# Patient Record
Sex: FEMALE | Race: WHITE | Employment: FULL TIME | URBAN - METROPOLITAN AREA
[De-identification: names, ages, dates, MRNs, and addresses within clinical notes are randomized per-mention and may not be internally consistent; named-entity substitution may affect disease eponyms.]

---

## 2017-01-30 ENCOUNTER — OFFICE VISIT (OUTPATIENT)
Dept: FAMILY MEDICINE CLINIC | Facility: CLINIC | Age: 27
End: 2017-01-30

## 2017-01-30 DIAGNOSIS — H65.93 BILATERAL NON-SUPPURATIVE OTITIS MEDIA: Primary | ICD-10-CM

## 2017-01-30 DIAGNOSIS — J02.9 PHARYNGITIS, UNSPECIFIED ETIOLOGY: ICD-10-CM

## 2017-01-30 LAB — CONTROL LINE PRESENT WITH A CLEAR BACKGROUND (YES/NO): YES YES/NO

## 2017-01-30 PROCEDURE — 87880 STREP A ASSAY W/OPTIC: CPT | Performed by: NURSE PRACTITIONER

## 2017-01-30 PROCEDURE — 99213 OFFICE O/P EST LOW 20 MIN: CPT | Performed by: NURSE PRACTITIONER

## 2017-01-30 PROCEDURE — 87081 CULTURE SCREEN ONLY: CPT | Performed by: NURSE PRACTITIONER

## 2017-01-30 RX ORDER — AZITHROMYCIN 250 MG/1
TABLET, FILM COATED ORAL
Qty: 6 TABLET | Refills: 0 | Status: SHIPPED | OUTPATIENT
Start: 2017-01-30 | End: 2017-06-15 | Stop reason: ALTCHOICE

## 2017-02-02 ENCOUNTER — TELEPHONE (OUTPATIENT)
Dept: FAMILY MEDICINE CLINIC | Facility: CLINIC | Age: 27
End: 2017-02-02

## 2017-02-02 VITALS
DIASTOLIC BLOOD PRESSURE: 68 MMHG | HEART RATE: 80 BPM | TEMPERATURE: 98 F | RESPIRATION RATE: 16 BRPM | WEIGHT: 196 LBS | SYSTOLIC BLOOD PRESSURE: 108 MMHG | HEIGHT: 67 IN | BODY MASS INDEX: 30.76 KG/M2

## 2017-02-02 NOTE — TELEPHONE ENCOUNTER
Calling to notify pt of neg strep Cx results. No ID on Voicemail, no informed consent in chart. Left voicemail for pt to return call re: results.   Jay Gonzales, 02/02/2017, 8:54 AM    2/3/2017  Spoke to patient and informed of neg strep throat cultu

## 2017-02-03 NOTE — PROGRESS NOTES
CHIEF COMPLAINT:   Patient presents with:  Pharyngitis: X3days    HPI:   Tim Astorga is a 32year old female presents to clinic with symptoms of sore throat for 3 days and bilateral ear pain since yesterday. Symptoms have been worsening since the onset. LUNGS: clear to auscultation bilaterally, no wheezes or rhonchi. Breathing is non labored. CARDIO: RRR without murmur  LYMPH:  Bilateral submandibular lymphadenopathy. No posterior cervical or occipital lymphadenopathy.     RAPID STREP A TEST IS NEGATIVE · Ease pain with anesthetic sprays. Aspirin or an aspirin substitute also helps.  Remember, never give aspirin to anyone 25 or younger, or if you are already taking blood thinners.   · For sore throats caused by allergies, try antihistamines to block the al

## 2017-04-03 ENCOUNTER — TELEPHONE (OUTPATIENT)
Dept: FAMILY MEDICINE CLINIC | Facility: CLINIC | Age: 27
End: 2017-04-03

## 2017-04-03 RX ORDER — LEVONORGESTREL AND ETHINYL ESTRADIOL 0.15-0.03
1 KIT ORAL DAILY
Qty: 1 PACKAGE | Refills: 2 | Status: SHIPPED | OUTPATIENT
Start: 2017-04-03 | End: 2017-04-03

## 2017-04-03 RX ORDER — LEVONORGESTREL AND ETHINYL ESTRADIOL 0.15-0.03
1 KIT ORAL DAILY
Qty: 1 PACKAGE | Refills: 0 | Status: SHIPPED | OUTPATIENT
Start: 2017-04-03 | End: 2017-06-15

## 2017-04-03 RX ORDER — MELOXICAM 15 MG/1
TABLET ORAL
Refills: 2 | COMMUNITY
Start: 2017-03-06 | End: 2017-06-15 | Stop reason: ALTCHOICE

## 2017-04-03 NOTE — TELEPHONE ENCOUNTER
Received the following message. Script was not received. Please resend. An error occurred while processing the e-prescribing message.        The message was not sent electronically to the requested pharmacy.  Contact the pharmacy about the new prescr

## 2017-04-04 NOTE — TELEPHONE ENCOUNTER
Patient advised.     Future Appointments  Date Time Provider Brendan Roche   6/1/2017 9:00 AM TIM Laureano [facility]

## 2017-04-16 ENCOUNTER — APPOINTMENT (OUTPATIENT)
Dept: ULTRASOUND IMAGING | Facility: HOSPITAL | Age: 27
End: 2017-04-16
Attending: EMERGENCY MEDICINE
Payer: COMMERCIAL

## 2017-04-16 ENCOUNTER — HOSPITAL ENCOUNTER (EMERGENCY)
Facility: HOSPITAL | Age: 27
Discharge: HOME | End: 2017-04-16
Attending: EMERGENCY MEDICINE
Payer: COMMERCIAL

## 2017-04-16 VITALS
DIASTOLIC BLOOD PRESSURE: 78 MMHG | BODY MASS INDEX: 29.82 KG/M2 | OXYGEN SATURATION: 98 % | WEIGHT: 190 LBS | HEART RATE: 76 BPM | RESPIRATION RATE: 16 BRPM | TEMPERATURE: 98 F | HEIGHT: 67 IN | SYSTOLIC BLOOD PRESSURE: 128 MMHG

## 2017-04-16 DIAGNOSIS — K29.70 GASTRITIS WITHOUT BLEEDING, UNSPECIFIED CHRONICITY, UNSPECIFIED GASTRITIS TYPE: Primary | ICD-10-CM

## 2017-04-16 PROCEDURE — 36415 COLL VENOUS BLD VENIPUNCTURE: CPT

## 2017-04-16 PROCEDURE — 83690 ASSAY OF LIPASE: CPT | Performed by: EMERGENCY MEDICINE

## 2017-04-16 PROCEDURE — 76705 ECHO EXAM OF ABDOMEN: CPT

## 2017-04-16 PROCEDURE — 99284 EMERGENCY DEPT VISIT MOD MDM: CPT

## 2017-04-16 PROCEDURE — 82248 BILIRUBIN DIRECT: CPT | Performed by: EMERGENCY MEDICINE

## 2017-04-16 PROCEDURE — 80053 COMPREHEN METABOLIC PANEL: CPT | Performed by: EMERGENCY MEDICINE

## 2017-04-16 PROCEDURE — 85025 COMPLETE CBC W/AUTO DIFF WBC: CPT | Performed by: EMERGENCY MEDICINE

## 2017-04-16 RX ORDER — RANITIDINE 150 MG/1
150 TABLET ORAL EVERY 12 HOURS
Qty: 60 TABLET | Refills: 0 | Status: SHIPPED | OUTPATIENT
Start: 2017-04-16 | End: 2017-04-26

## 2017-04-16 RX ORDER — MAGNESIUM HYDROXIDE/ALUMINUM HYDROXICE/SIMETHICONE 120; 1200; 1200 MG/30ML; MG/30ML; MG/30ML
30 SUSPENSION ORAL ONCE
Status: COMPLETED | OUTPATIENT
Start: 2017-04-16 | End: 2017-04-16

## 2017-04-16 NOTE — ED INITIAL ASSESSMENT (HPI)
Mid upper abd pain that radiates acrodd upper abd and started at 0300.  Fentanyl and zofran given via EMS

## 2017-04-16 NOTE — ED PROVIDER NOTES
Patient Seen in: [facility] AND [facility] Emergency Department    History   Patient presents with:  Abdomen/Flank Pain (GI/)    Stated Complaint: mid upper abd pain    HPI    Patient complains of mid upper  abdominal pain that began last night at work.   Pain Wt 86.183 kg  BMI 29.75 kg/m2  SpO2 98%  LMP 04/14/2017 (Exact Date)  Pulse ox Nl on room air          Physical Exam  General Appearance: alert, no distress  Eyes: pupils equal and round no pallor or injection  ENT, Mouth: mucous membranes moist  Respirato unspecified chronicity, unspecified gastritis type  (primary encounter diagnosis)    Disposition:  Discharge    Follow-up:  Romain Brumfield  [phone]            Medications Prescribed:  Discharge Me

## 2017-06-15 ENCOUNTER — OFFICE VISIT (OUTPATIENT)
Dept: FAMILY MEDICINE CLINIC | Facility: CLINIC | Age: 27
End: 2017-06-15

## 2017-06-15 ENCOUNTER — LAB ENCOUNTER (OUTPATIENT)
Dept: LAB | Age: 27
End: 2017-06-15
Attending: NURSE PRACTITIONER
Payer: COMMERCIAL

## 2017-06-15 VITALS
TEMPERATURE: 98 F | BODY MASS INDEX: 30 KG/M2 | WEIGHT: 193 LBS | SYSTOLIC BLOOD PRESSURE: 110 MMHG | DIASTOLIC BLOOD PRESSURE: 70 MMHG | HEART RATE: 82 BPM

## 2017-06-15 DIAGNOSIS — Z13.0 SCREENING FOR IRON DEFICIENCY ANEMIA: ICD-10-CM

## 2017-06-15 DIAGNOSIS — Z13.220 SCREENING FOR LIPOID DISORDERS: ICD-10-CM

## 2017-06-15 DIAGNOSIS — Z00.00 ENCOUNTER FOR HEALTH MAINTENANCE EXAMINATION IN ADULT: ICD-10-CM

## 2017-06-15 DIAGNOSIS — Z00.00 ENCOUNTER FOR HEALTH MAINTENANCE EXAMINATION IN ADULT: Primary | ICD-10-CM

## 2017-06-15 PROCEDURE — 84439 ASSAY OF FREE THYROXINE: CPT | Performed by: NURSE PRACTITIONER

## 2017-06-15 PROCEDURE — 80061 LIPID PANEL: CPT | Performed by: NURSE PRACTITIONER

## 2017-06-15 PROCEDURE — 80050 GENERAL HEALTH PANEL: CPT | Performed by: NURSE PRACTITIONER

## 2017-06-15 PROCEDURE — 99395 PREV VISIT EST AGE 18-39: CPT | Performed by: NURSE PRACTITIONER

## 2017-06-15 PROCEDURE — 36415 COLL VENOUS BLD VENIPUNCTURE: CPT | Performed by: NURSE PRACTITIONER

## 2017-06-15 RX ORDER — SCOLOPAMINE TRANSDERMAL SYSTEM 1 MG/1
1 PATCH, EXTENDED RELEASE TRANSDERMAL
Qty: 1 PATCH | Refills: 6 | Status: SHIPPED | OUTPATIENT
Start: 2017-06-15 | End: 2019-07-01

## 2017-06-15 RX ORDER — ACYCLOVIR 400 MG/1
400 TABLET ORAL
Qty: 25 TABLET | Refills: 6 | Status: SHIPPED | OUTPATIENT
Start: 2017-06-15 | End: 2018-06-27

## 2017-06-15 RX ORDER — LEVONORGESTREL AND ETHINYL ESTRADIOL 0.15-0.03
1 KIT ORAL DAILY
Qty: 1 PACKAGE | Refills: 12 | Status: SHIPPED | OUTPATIENT
Start: 2017-06-15 | End: 2018-05-01

## 2017-06-15 NOTE — PROGRESS NOTES
HPI:    Patient ID: Vijay Gillis is a 32year old female. HPI  Missed 5 days of birth control pills the week May 22, was not sexually active at that time- or since. So she denies any possibility of pregnancy.   Patient states that the week that she mi Comment:Other reaction(s): rash  Latex                       Comment:Other reaction(s): dermatitis  Cephalosporins             PHYSICAL EXAM:   Physical Exam   Constitutional: She is oriented to person, place, and time.  Vital signs are no strength. Skin: Skin is warm, dry and intact. No cyanosis. Nails show no clubbing. Psychiatric: She has a normal mood and affect. Her behavior is normal. Judgment and thought content normal.   Nursing note and vitals reviewed.              ASSESSMENT/PL

## 2017-06-15 NOTE — PATIENT INSTRUCTIONS
-I would recommend not taking the 4th week of your current month of pills- and you should have a period- then restart next months pack on that Sunday -  And follow pills as usual.     -fasting blood work today

## 2017-10-25 ENCOUNTER — OFFICE VISIT (OUTPATIENT)
Dept: FAMILY MEDICINE CLINIC | Facility: CLINIC | Age: 27
End: 2017-10-25

## 2017-10-25 VITALS
BODY MASS INDEX: 30.8 KG/M2 | DIASTOLIC BLOOD PRESSURE: 68 MMHG | SYSTOLIC BLOOD PRESSURE: 106 MMHG | WEIGHT: 187.13 LBS | TEMPERATURE: 97 F | HEART RATE: 90 BPM | HEIGHT: 65.25 IN | RESPIRATION RATE: 18 BRPM

## 2017-10-25 DIAGNOSIS — Z01.818 PREOP EXAMINATION: Primary | ICD-10-CM

## 2017-10-25 DIAGNOSIS — S83.242D ACUTE MEDIAL MENISCUS TEAR OF LEFT KNEE, SUBSEQUENT ENCOUNTER: ICD-10-CM

## 2017-10-25 PROBLEM — Z86.19 HISTORY OF HERPES GENITALIS: Status: ACTIVE | Noted: 2017-10-25

## 2017-10-25 PROCEDURE — 99243 OFF/OP CNSLTJ NEW/EST LOW 30: CPT | Performed by: FAMILY MEDICINE

## 2017-10-25 NOTE — PROGRESS NOTES
[facility]  PRO[facility]  Chief Complaint:   Patient presents with:  Pre-Op Exam      HPI:   This is a 32year old female coming in for preop evaluation for left knee surgery.   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  (Patient taking differently: Take 400 mg by mouth every 4 (four) hours while awake.  ) Disp: 25 tablet Rfl: 6      Counseling given: Not Answered worsening or changing symptoms. Patient is to call with any side effects or complications from the treatments as a result of today.      Problem List:  Patient Active Problem List:     History of herpes genitalis      Melissa Acosta MD  10/25/2017  2:23

## 2017-10-30 ENCOUNTER — TELEPHONE (OUTPATIENT)
Dept: FAMILY MEDICINE CLINIC | Facility: CLINIC | Age: 27
End: 2017-10-30

## 2018-04-09 ENCOUNTER — OFFICE VISIT (OUTPATIENT)
Dept: FAMILY MEDICINE CLINIC | Facility: CLINIC | Age: 28
End: 2018-04-09

## 2018-04-09 ENCOUNTER — TELEPHONE (OUTPATIENT)
Dept: FAMILY MEDICINE CLINIC | Facility: CLINIC | Age: 28
End: 2018-04-09

## 2018-04-09 VITALS
TEMPERATURE: 98 F | WEIGHT: 172.38 LBS | HEART RATE: 86 BPM | BODY MASS INDEX: 28 KG/M2 | OXYGEN SATURATION: 98 % | SYSTOLIC BLOOD PRESSURE: 120 MMHG | DIASTOLIC BLOOD PRESSURE: 84 MMHG

## 2018-04-09 DIAGNOSIS — L70.0 ACNE VULGARIS: Primary | ICD-10-CM

## 2018-04-09 PROCEDURE — 99213 OFFICE O/P EST LOW 20 MIN: CPT | Performed by: NURSE PRACTITIONER

## 2018-04-09 NOTE — PROGRESS NOTES
Evette Butt is a 32year old female.   Patient presents with:  Referral: wants a referral to derm for dry skin and mole check      HPI:   Complaints of feeling face is dry- worse in the last 2 months- is braking out also-   States she went to [employer] Paternal Grandfather    • Lipids Paternal Grandfather    • Heart Disorder Paternal Grandfather      first MI age 48   • Hypertension Father    • Eye Problems Mother    • Fibromyalgia Mother    • pituitary adenoma [OTHER] Mother    • Arthritis Maternal [facility] and the [place] sparingly- as needed.      Follow up with dermatology

## 2018-04-09 NOTE — PATIENT INSTRUCTIONS
Try Neutragena moisturizer, cetafil  soap- or purpose soap. Hydrocortisone cream sparingly- as needed.      Follow up with dermatology

## 2018-04-09 NOTE — TELEPHONE ENCOUNTER
Pt asked to have a referral to check moles and to address dry and sensitive skin. Pt informed appt is needed. Pt asked to have appt with EL. Last px done w/ EL 6/15/17.   Pt advised can get re-established with CR this summer, pt urged to schedule appt wi

## 2018-05-01 RX ORDER — LEVONORGESTREL AND ETHINYL ESTRADIOL 0.15-0.03
1 KIT ORAL DAILY
Qty: 1 PACKAGE | Refills: 0 | Status: SHIPPED | OUTPATIENT
Start: 2018-05-01 | End: 2018-06-27

## 2018-05-01 NOTE — TELEPHONE ENCOUNTER
Future appt:    Last Appointment:  4/9/2018    Cholesterol, Total (mg/dL)   Date Value   06/15/2017 160   ----------  HDL Cholesterol (mg/dL)   Date Value   06/15/2017 51   ----------  LDL Cholesterol (mg/dL)   Date Value   06/15/2017 73   ----------  Trig

## 2018-06-27 ENCOUNTER — OFFICE VISIT (OUTPATIENT)
Dept: FAMILY MEDICINE CLINIC | Facility: CLINIC | Age: 28
End: 2018-06-27

## 2018-06-27 VITALS
BODY MASS INDEX: 25.9 KG/M2 | OXYGEN SATURATION: 98 % | WEIGHT: 165 LBS | SYSTOLIC BLOOD PRESSURE: 112 MMHG | TEMPERATURE: 97 F | DIASTOLIC BLOOD PRESSURE: 80 MMHG | RESPIRATION RATE: 16 BRPM | HEIGHT: 67 IN | HEART RATE: 76 BPM

## 2018-06-27 DIAGNOSIS — Z01.411 ENCOUNTER FOR GYNECOLOGICAL EXAMINATION WITH ABNORMAL FINDING: ICD-10-CM

## 2018-06-27 DIAGNOSIS — Z00.00 ROUTINE GENERAL MEDICAL EXAMINATION AT A HEALTH CARE FACILITY: Primary | ICD-10-CM

## 2018-06-27 DIAGNOSIS — N88.9 ABNORMAL APPEARANCE OF CERVIX: ICD-10-CM

## 2018-06-27 PROCEDURE — 99395 PREV VISIT EST AGE 18-39: CPT | Performed by: NURSE PRACTITIONER

## 2018-06-27 PROCEDURE — 88175 CYTOPATH C/V AUTO FLUID REDO: CPT | Performed by: NURSE PRACTITIONER

## 2018-06-27 RX ORDER — ACYCLOVIR 400 MG/1
400 TABLET ORAL
Qty: 25 TABLET | Refills: 6 | Status: SHIPPED | OUTPATIENT
Start: 2018-06-27 | End: 2018-07-02

## 2018-06-27 RX ORDER — LEVONORGESTREL AND ETHINYL ESTRADIOL 0.15-0.03
1 KIT ORAL DAILY
Qty: 1 PACKAGE | Refills: 12 | Status: SHIPPED | OUTPATIENT
Start: 2018-06-27

## 2018-06-27 NOTE — PROGRESS NOTES
HPI:    Patient ID: Melida Davidson is a 32year old female. HPI    Patient is present for her annual wellness exam.  States in general that she has been feeling well. 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     States doris Eyes: Conjunctivae and EOM are normal. Pupils are equal, round, and reactive to light. Neck: Normal range of motion. Neck supple. Cardiovascular: Normal rate, regular rhythm, normal heart sounds and intact distal pulses.     Pulmonary/Chest: Effort norm

## 2018-12-26 ENCOUNTER — WALK IN (OUTPATIENT)
Dept: URGENT CARE | Age: 28
End: 2018-12-26

## 2018-12-26 VITALS
WEIGHT: 170 LBS | RESPIRATION RATE: 18 BRPM | SYSTOLIC BLOOD PRESSURE: 110 MMHG | BODY MASS INDEX: 26.68 KG/M2 | HEART RATE: 86 BPM | HEIGHT: 67 IN | DIASTOLIC BLOOD PRESSURE: 72 MMHG | TEMPERATURE: 98 F

## 2018-12-26 DIAGNOSIS — H60.391 OTHER INFECTIVE ACUTE OTITIS EXTERNA OF RIGHT EAR: Primary | ICD-10-CM

## 2018-12-26 PROCEDURE — 99203 OFFICE O/P NEW LOW 30 MIN: CPT | Performed by: NURSE PRACTITIONER

## 2018-12-26 RX ORDER — NEOMYCIN SULFATE, POLYMYXIN B SULFATE, HYDROCORTISONE 3.5; 10000; 1 MG/ML; [USP'U]/ML; MG/ML
4 SOLUTION/ DROPS AURICULAR (OTIC) 4 TIMES DAILY
Qty: 10 ML | Refills: 0 | Status: SHIPPED | OUTPATIENT
Start: 2018-12-26 | End: 2019-01-02

## 2018-12-26 RX ORDER — LEVONORGESTREL AND ETHINYL ESTRADIOL 0.15-0.03
1 KIT ORAL DAILY
COMMUNITY

## 2018-12-26 ASSESSMENT — ENCOUNTER SYMPTOMS
HEMATOLOGIC/LYMPHATIC NEGATIVE: 1
GASTROINTESTINAL NEGATIVE: 1
CONSTITUTIONAL NEGATIVE: 1
RESPIRATORY NEGATIVE: 1
ALLERGIC/IMMUNOLOGIC NEGATIVE: 1
PSYCHIATRIC NEGATIVE: 1
EYES NEGATIVE: 1
NEUROLOGICAL NEGATIVE: 1

## 2019-07-01 RX ORDER — SCOLOPAMINE TRANSDERMAL SYSTEM 1 MG/1
1 PATCH, EXTENDED RELEASE TRANSDERMAL
Qty: 1 PATCH | Refills: 6 | Status: SHIPPED | OUTPATIENT
Start: 2019-07-01

## 2019-07-01 NOTE — TELEPHONE ENCOUNTER
Attempted to call pt but message stated that her voicemail was full. PT IS DUE FOR HER ANNUAL PHYSICAL    Future appt:    Last Appointment:  6/15/17 for physical with you - saw Montrell Montgomeryricia 6/27/18 for physical.    This the second request for the Scopolamine Patch. See other refill request that was sent yesterday. Cholesterol, Total (mg/dL)   Date Value   06/15/2017 160     HDL Cholesterol (mg/dL)   Date Value   06/15/2017 51     LDL Cholesterol (mg/dL)   Date Value   06/15/2017 73     Triglycerides (mg/dL)   Date Value   06/15/2017 179 (H)     No results found for: EAG, A1C  Lab Results   Component Value Date    T4F 0.9 06/15/2017    TSH 1.010 06/15/2017       No follow-ups on file.

## 2019-07-01 NOTE — TELEPHONE ENCOUNTER
Please advise refill of Scopolamine Patch.   Last Rx: 6/15/17    Future appt:    Last Appointment:  6/15/17 for physical with you - saw Magdi Best 6/27/18 for physical.    Cholesterol, Total (mg/dL)   Date Value   06/15/2017 160     HDL Cholesterol (mg/dL)   Carlo

## 2019-07-09 RX ORDER — LEVONORGESTREL AND ETHINYL ESTRADIOL 0.15-0.03
1 KIT ORAL DAILY
Qty: 91 TABLET | Refills: 0 | Status: SHIPPED | OUTPATIENT
Start: 2019-07-09

## 2019-07-09 RX ORDER — LEVONORGESTREL AND ETHINYL ESTRADIOL 0.15-0.03
KIT ORAL
Qty: 91 TABLET | Refills: 0 | OUTPATIENT
Start: 2019-07-09

## 2019-07-09 RX ORDER — SCOLOPAMINE TRANSDERMAL SYSTEM 1 MG/1
PATCH, EXTENDED RELEASE TRANSDERMAL
Qty: 1 PATCH | Refills: 0 | OUTPATIENT
Start: 2019-07-09

## 2019-07-09 NOTE — TELEPHONE ENCOUNTER
Pt states she is changing doctors,   states she will need one more birth control refill to get her thru to her new MD appt next month. Last wellness exam on file per CS- done 6/27/18    Pt informed that Scopolamine patch refill request was approved on 7/1- pt will use for vacationtravel. PCP change form completed.

## 2019-07-09 NOTE — TELEPHONE ENCOUNTER
Pt will need a f/u appointment    Future appt: No future appointments.    Last Appointment:  6/27/18  Last Refill: Sandie Major 6/27/19  Cholesterol, Total (mg/dL)   Date Value   06/15/2017 160     HDL Cholesterol (mg/dL)   Date Value   06/15/2017 51     LDL Ch

## 2019-08-01 NOTE — PATIENT INSTRUCTIONS
Self-Care for Sore Throats  Sore throats happen for many reasons, such as colds, allergies, and infections caused by viruses or bacteria. In any case, your throat becomes red and sore.  Your goal for self-care is to reduce your discomfort while giving you Contact your healthcare provider if you have:  · A temperature over 101°F (38.3°C)  · White spots on the throat  · Great difficulty swallowing  · Trouble breathing  · A skin rash  · Recent exposure to someone else with strep bacteria  · Severe hoarseness a Detail Level: Simple Hide Include Location In Plan Question?: No Include Location In Plan?: Yes

## (undated) NOTE — MR AVS SNAPSHOT
[address]  [place] [address] [id number] [phone]               Thank you for choosing us for your health care visit with Gino Saxena NP.   We are glad to serve you and happy to provide you with this summary of helps. Remember, never give aspirin to anyone 25 or younger, or if you are already taking blood thinners.   · For sore throats caused by allergies, try antihistamines to block the allergic reaction.   · Remember: unless a sore throat is caused by a bacteria QUASENSE 0.15-0.03 MG Tabs   Generic drug:  Levonorgest-Eth Estrad 91-Day           TOBREX 0.3 % Soln   Generic drug:  Tobramycin Sulfate           TRANSDERM-SCOP (1.5 MG) 1 MG/3DAYS Pt72   Generic drug:  Scopolamine                Where to Get Your Medic Eat plenty of low-fat dairy products High fat meats and dairy   Choose whole grain products Foods high in sodium   Water is best for hydration Fast food.    Eat at home when possible     Tips for increasing your physical activity – Adults who are physically

## (undated) NOTE — LETTER
5/3/2018  Giovanny Subramanian  [address] [place] [address]    We take each of our patient's health very seriously and the key to maintaining your health is an annual wellness physical.  Review of your medical records shows that it is time for your annual wel

## (undated) NOTE — ED AVS SNAPSHOT
[facility]    [address]     [address] [place] [address]    Phone:  [phone]    Fax:  [phone]           Loretta Miky   MRN: [id number]    Department:  [facility]   Date of Visit:  4/16/ GASTRITIS (ADULT) (ENGLISH)      Disclosure     Insurance plans vary and the physician(s) referred by the ER may not be covered by your plan.  Please contact your insurance company to determine coverage and benefits available for follow-up care and referra If you have been prescribed any medication(s), please fill your prescription right away and begin taking the medication(s) as directed.   If you believe that any of the medications or instructions on this list is different from what your Primary Care doctor - If you don’t have insurance, Glenda Evans has partnered with [employer] [employer] to help you get signed up for insurance coverage.   [employer] [facility] [employer] is a Federal Navigator program that can help with your Affordable Care Act cover

## (undated) NOTE — MR AVS SNAPSHOT
[address]  [place] [address] [phone]  [phone]               Thank you for choosing us for your health care visit with TIM Guillen.   We are glad to serve you and happy to provide you with this summary o 110/70 mmHg 82 98.2 °F (36.8 °C) (Tympanic) 193 lb           Current Medications          This list is accurate as of: 6/15/17  9:38 AM.  Always use your most recent med list.                acyclovir 400 MG Tabs   Commonly known as:  ZOVIRAX Aerobic physical activity Regular aerobic physical activity (e.g., brisk walking, light jogging, cycling, swimming, etc.) for a goal of at least 150 minutes per week. Moderation of alcohol consumption Men: limit to <= 2 drinks* per day.   Women and lighte

## (undated) NOTE — ED AVS SNAPSHOT
[facility]    [address] [place] [address]    Phone:  [phone]    Fax:  [phone]           Summer Ceja   MRN: [id number]    Department:  [facility]   Date of Visit:  4/16/ and Class Registration line at [phone] or find a doctor online by visiting [email or web address].    IF THERE IS ANY CHANGE OR WORSENING OF YOUR CONDITION, CALL YOUR PRIMARY CARE PHYSICIAN AT ONCE OR RETURN IMMEDIATELY TO [address] [place] [address].     If